# Patient Record
Sex: FEMALE | Race: WHITE | Employment: OTHER | URBAN - METROPOLITAN AREA
[De-identification: names, ages, dates, MRNs, and addresses within clinical notes are randomized per-mention and may not be internally consistent; named-entity substitution may affect disease eponyms.]

---

## 2020-01-29 ENCOUNTER — NURSING HOME VISIT (OUTPATIENT)
Dept: GERIATRICS | Facility: CLINIC | Age: 70
End: 2020-01-29
Payer: COMMERCIAL

## 2020-01-29 VITALS
OXYGEN SATURATION: 96 % | DIASTOLIC BLOOD PRESSURE: 54 MMHG | TEMPERATURE: 97.7 F | WEIGHT: 186 LBS | HEART RATE: 88 BPM | RESPIRATION RATE: 16 BRPM | HEIGHT: 65 IN | SYSTOLIC BLOOD PRESSURE: 114 MMHG | BODY MASS INDEX: 30.99 KG/M2

## 2020-01-29 DIAGNOSIS — J44.9 CHRONIC OBSTRUCTIVE PULMONARY DISEASE, UNSPECIFIED COPD TYPE (H): ICD-10-CM

## 2020-01-29 DIAGNOSIS — G47.00 INSOMNIA, UNSPECIFIED TYPE: ICD-10-CM

## 2020-01-29 DIAGNOSIS — Z72.0 TOBACCO ABUSE: ICD-10-CM

## 2020-01-29 DIAGNOSIS — R60.0 LOCALIZED EDEMA: ICD-10-CM

## 2020-01-29 DIAGNOSIS — E87.6 HYPOKALEMIA: ICD-10-CM

## 2020-01-29 DIAGNOSIS — E44.0 MODERATE PROTEIN-CALORIE MALNUTRITION (H): ICD-10-CM

## 2020-01-29 DIAGNOSIS — K61.2 ABSCESS OF ANAL AND RECTAL REGIONS: Primary | ICD-10-CM

## 2020-01-29 DIAGNOSIS — K52.9 CHRONIC DIARRHEA: ICD-10-CM

## 2020-01-29 DIAGNOSIS — G47.33 OSA (OBSTRUCTIVE SLEEP APNEA): ICD-10-CM

## 2020-01-29 DIAGNOSIS — R19.7 DIARRHEA, UNSPECIFIED TYPE: ICD-10-CM

## 2020-01-29 PROCEDURE — 99310 SBSQ NF CARE HIGH MDM 45: CPT | Performed by: NURSE PRACTITIONER

## 2020-01-29 RX ORDER — GABAPENTIN 100 MG/1
100 CAPSULE ORAL 3 TIMES DAILY
COMMUNITY
End: 2020-01-30

## 2020-01-29 RX ORDER — PYRIDOXINE HCL (VITAMIN B6) 25 MG
25 TABLET ORAL DAILY
COMMUNITY

## 2020-01-29 RX ORDER — IPRATROPIUM BROMIDE AND ALBUTEROL SULFATE 2.5; .5 MG/3ML; MG/3ML
1 SOLUTION RESPIRATORY (INHALATION) 4 TIMES DAILY
COMMUNITY

## 2020-01-29 RX ORDER — METRONIDAZOLE 250 MG/1
250 TABLET ORAL 3 TIMES DAILY
COMMUNITY

## 2020-01-29 RX ORDER — HYOSCYAMINE SULFATE 0.125 MG
0.12 TABLET ORAL EVERY 4 HOURS PRN
COMMUNITY

## 2020-01-29 RX ORDER — LANOLIN ALCOHOL/MO/W.PET/CERES
3 CREAM (GRAM) TOPICAL AT BEDTIME
COMMUNITY

## 2020-01-29 RX ORDER — FUROSEMIDE 20 MG
20 TABLET ORAL DAILY
COMMUNITY

## 2020-01-29 RX ORDER — LEVOFLOXACIN 750 MG/1
750 TABLET, FILM COATED ORAL DAILY
COMMUNITY

## 2020-01-29 ASSESSMENT — MIFFLIN-ST. JEOR: SCORE: 1359.57

## 2020-01-29 NOTE — PROGRESS NOTES
[place] [facility]  PRIMARY CARE PROVIDER AND CLINIC:  No primary care provider on file., No primary physician on file.  Chief Complaint   Patient presents with     Establish Care     [place] Medical Record Number:  [id number]  Place of Service where encounter took place:  [facility] [[id number]]    Josselyn Kilgore  is a 69 year old  (1950), admitted to the above facility from [facility]..  Admitted to this facility for  rehab, medical management and nursing care.    HPI:    HPI information obtained from: facility chart records, facility staff, patient report, [facility] chart review and Care Everywhere [facility] chart review.   Brief Summary of Hospital Course:   HOSPITAL COURSE:  Patient with chronic diarrhea x 6 months (started June 2019). Previous cx neg for c diff or infectious cause. MRI from Sept shows abscess, patient did not follow up.  Presented to the hospital with weakness. CT scan showed a  rectosigmoid junction abscess. She was hypotensive and an elevated lactic acid. Acidosis and blood pressure improved with IV fluids.  Patient was placed on broad-spectrum antibiotics with cefepime, Flagyl, and vancomycin. She was seen by IR and had a percutaneous drain placed with evacuation of pus. Cultures showing likely enterococcus but also has a gram-negative bacilli.    Patient discharged to home from the hospital, but was unable to get up on her own 2/2 to weakness. She is admitted to TCU for strengthening. She is oral flagyl and Levaquin.      Updates on Status Since Skilled nursing Admission:   - patient reports she is tolerating drain with minimal pain. States GI will remove in 5 day. She states she was told she did not need to take Levaquin and has been refusing this.     - patient has not smoked since admission to TCU, states she is tolerating this and does not feel she needs a nicotine patch. Feels her breathing is at baseline.     No fever,  "chills. Feels weak, no pain.   No nsg concerns    CODE STATUS/ADVANCE DIRECTIVES DISCUSSION:   CPR/Full code   Patient's living condition: lives with spouse  ALLERGIES: Patient has no known allergies.  PAST MEDICAL HISTORY:  has no past medical history on file.  PAST SURGICAL HISTORY:   has no past surgical history on file.  FAMILY HISTORY: family history is not on file.  SOCIAL HISTORY:       Post Discharge Medication Reconciliation Status: discharge medications reconciled, continue medications without change    Current Outpatient Medications   Medication Sig Dispense Refill     cholecalciferol (VITAMIN D3) 5000 units (125 mcg) capsule Take 5,000 Units by mouth daily       furosemide (LASIX) 20 MG tablet Take 20 mg by mouth daily       hyoscyamine (ANASPAZ/LEVSIN) 0.125 MG tablet Take 0.125 mg by mouth every 4 hours as needed for cramping       ipratropium - albuterol 0.5 mg/2.5 mg/3 mL (DUONEB) 0.5-2.5 (3) MG/3ML neb solution Take 1 vial by nebulization 4 times daily       levofloxacin (LEVAQUIN) 750 MG tablet Take 750 mg by mouth daily       melatonin 3 MG tablet Take 3 mg by mouth At Bedtime       metroNIDAZOLE (FLAGYL) 250 MG tablet Take 250 mg by mouth 3 times daily       pyridOXINE (VITAMIN  B-6) 25 MG tablet Take 25 mg by mouth daily         ROS:  4 point ROS including Respiratory, CV, GI and , other than that noted in the HPI,  is negative    Vitals:  /54   Pulse 88   Temp 97.7  F (36.5  C)   Resp 16   Ht 1.651 m (5' 5\")   Wt 84.4 kg (186 lb)   SpO2 96%   BMI 30.95 kg/m    Exam:  GENERAL APPEARANCE:  Alert, in no distress  RESP:  no respiratory distress, diminished breath sounds with faint exp wheezing upper lobes  CV:  regular rate and rhythm, no murmur, rub, or gallop, + generalized edema  ABDOMEN:  diffuse tenderness, soft, no guarding or rebound, bowel sounds normal  SKIN:  arms with extensive ecchymotic areas, multiple small skin tears  PSYCH:  oriented X 3, affect and mood " normal    Lab/Diagnostic data:        ASSESSMENT/PLAN:  Abscess of anal and rectal regions  Chronic diarrhea  - CT with: 6.2x4.7x7.1 cm abscess along the superior and posterio aspect of the rectosigmoid junction   - flex sig 1/22, biopsies obtained, abscess drain placed  - cx's likely enterococcus but also has a gram-negative bacilli  Discharged on flagyl and Levaquin. Patient stating she was told by GI that she did not need Levaquin. RN to call GI to verify orders  - Due to f/u with GI 2/3/20 for drain removal    Diarrhea, unspecified type  - chronic, stool cx's previously negative  - to f/u with GI    Chronic obstructive pulmonary disease, unspecified COPD type (H)  Tobacco abuse  - has tolerated no cigaretts since admission to TCU, will continue to encourage smoking cessation  - continue duo nebs  - nsg to update with breathing concerns    EMELINA (obstructive sleep apnea)  - continue CPAP    Localized edema  - diffuse. Likely 2/2 to low albumin and limited mobility  - no known hx of CHF, will check daily wt's  - continue lasix for now, labs ordered    Hypokalemia  - last K+ from hosp 2.3. Is not on replacement  - on lasix and having diarrhea, will re check labs     Insomnia  - Patient requesting sleep aid, will start melatonin  - patient refusing gabapentin, will discontinue     Protein calorie malnturition  - albumin 2.0, significant wt loss  - dietician to see  - will recheck labs    transcribed by : Lima Guerra  Orders:  1. Melatonin 3 mg at bedtime - dx: insomnia  2. Discontinue Gabapentin  3. CBC, CMP - dx: Rectal abcess  4. Weigh pt every day x 7 days - dx: weight loss  5. Discontinue ASA  6. Dietician to see - dx: wt. loss    Total time spent with patient visit at the [facility] was 36 including patient visit, review of past records and d/w RN. Greater than 50% of total time spent with counseling and coordinating care due to recent rectal abscess with imaging and d/w RN and pt  about medications concerns and antibiotics regimen. D/w patient re smoking cessation counseling.    Electronically signed by:  RAYRAY Guzman CNP

## 2020-01-29 NOTE — LETTER
1/29/2020        RE: Josselyn Kilgore  [address] [facility] [address] [facility] [address]        [place] [facility]  PRIMARY CARE PROVIDER AND CLINIC:  No primary care provider on file., No primary physician on file.  Chief Complaint   Patient presents with     Establish Care     [place] Medical Record Number:  [id number]  Place of Service where encounter took place[facility] [[id number]]    Josselyn Kilgore  is a 69 year old  (1950), admitted to the above facility from [facility]..  Admitted to this facility for  rehab, medical management and nursing care.    HPI:    HPI information obtained from: facility chart records, facility staff, patient report, [facility] chart review and Care Everywhere [facility] chart review.   Brief Summary of Hospital Course:   HOSPITAL COURSE:  Patient with chronic diarrhea x 6 months (started June 2019). Previous cx neg for c diff or infectious cause. MRI from Sept shows abscess, patient did not follow up.  Presented to the hospital with weakness. CT scan showed a  rectosigmoid junction abscess. She was hypotensive and an elevated lactic acid. Acidosis and blood pressure improved with IV fluids.  Patient was placed on broad-spectrum antibiotics with cefepime, Flagyl, and vancomycin. She was seen by IR and had a percutaneous drain placed with evacuation of pus. Cultures showing likely enterococcus but also has a gram-negative bacilli.    Patient discharged to home from the hospital, but was unable to get up on her own 2/2 to weakness. She is admitted to TCU for strengthening. She is oral flagyl and Levaquin.      Updates on Status Since Skilled nursing Admission:   - patient reports she is tolerating drain with minimal pain. States GI will remove in 5 day. She states she was told she did not need to take Levaquin and has been refusing this.     - patient has not smoked since admission to TCU, states she is tolerating this and does not  "feel she needs a nicotine patch. Feels her breathing is at baseline.     No fever, chills. Feels weak, no pain.   No nsg concerns    CODE STATUS/ADVANCE DIRECTIVES DISCUSSION:   CPR/Full code   Patient's living condition: lives with spouse  ALLERGIES: Patient has no known allergies.  PAST MEDICAL HISTORY:  has no past medical history on file.  PAST SURGICAL HISTORY:   has no past surgical history on file.  FAMILY HISTORY: family history is not on file.  SOCIAL HISTORY:       Post Discharge Medication Reconciliation Status: discharge medications reconciled, continue medications without change    Current Outpatient Medications   Medication Sig Dispense Refill     cholecalciferol (VITAMIN D3) 5000 units (125 mcg) capsule Take 5,000 Units by mouth daily       furosemide (LASIX) 20 MG tablet Take 20 mg by mouth daily       hyoscyamine (ANASPAZ/LEVSIN) 0.125 MG tablet Take 0.125 mg by mouth every 4 hours as needed for cramping       ipratropium - albuterol 0.5 mg/2.5 mg/3 mL (DUONEB) 0.5-2.5 (3) MG/3ML neb solution Take 1 vial by nebulization 4 times daily       levofloxacin (LEVAQUIN) 750 MG tablet Take 750 mg by mouth daily       melatonin 3 MG tablet Take 3 mg by mouth At Bedtime       metroNIDAZOLE (FLAGYL) 250 MG tablet Take 250 mg by mouth 3 times daily       pyridOXINE (VITAMIN  B-6) 25 MG tablet Take 25 mg by mouth daily         ROS:  4 point ROS including Respiratory, CV, GI and , other than that noted in the HPI,  is negative    Vitals:  /54   Pulse 88   Temp 97.7  F (36.5  C)   Resp 16   Ht 1.651 m (5' 5\")   Wt 84.4 kg (186 lb)   SpO2 96%   BMI 30.95 kg/m     Exam:  GENERAL APPEARANCE:  Alert, in no distress  RESP:  no respiratory distress, diminished breath sounds with faint exp wheezing upper lobes  CV:  regular rate and rhythm, no murmur, rub, or gallop, + generalized edema  ABDOMEN:  diffuse tenderness, soft, no guarding or rebound, bowel sounds normal  SKIN:  arms with extensive ecchymotic " areas, multiple small skin tears  PSYCH:  oriented X 3, affect and mood normal    Lab/Diagnostic data:        ASSESSMENT/PLAN:  Abscess of anal and rectal regions  Chronic diarrhea  - CT with: 6.2x4.7x7.1 cm abscess along the superior and posterio aspect of the rectosigmoid junction   - flex sig 1/22, biopsies obtained, abscess drain placed  - cx's likely enterococcus but also has a gram-negative bacilli  Discharged on flagyl and Levaquin. Patient stating she was told by GI that she did not need Levaquin. RN to call GI to verify orders  - Due to f/u with GI 2/3/20 for drain removal    Diarrhea, unspecified type  - chronic, stool cx's previously negative  - to f/u with GI    Chronic obstructive pulmonary disease, unspecified COPD type (H)  Tobacco abuse  - has tolerated no cigaretts since admission to TCU, will continue to encourage smoking cessation  - continue duo nebs  - nsg to update with breathing concerns    EMELINA (obstructive sleep apnea)  - continue CPAP    Localized edema  - diffuse. Likely 2/2 to low albumin and limited mobility  - no known hx of CHF, will check daily wt's  - continue lasix for now, labs ordered    Hypokalemia  - last K+ from hosp 2.3. Is not on replacement  - on lasix and having diarrhea, will re check labs     Insomnia  - Patient requesting sleep aid, will start melatonin  - patient refusing gabapentin, will discontinue     Protein calorie malnturition  - albumin 2.0, significant wt loss  - dietician to see  - will recheck labs    transcribed by : Lima Guerra  Orders:  1. Melatonin 3 mg at bedtime - dx: insomnia  2. Discontinue Gabapentin  3. CBC, CMP - dx: Rectal abcess  4. Weigh pt every day x 7 days - dx: weight loss  5. Discontinue ASA  6. Dietician to see - dx: wt. loss    Total time spent with patient visit at the skilled nursing facility was 36 including patient visit, review of past records and d/w RN. Greater than 50% of total time spent with counseling and  coordinating care due to recent rectal abscess with imaging and d/w RN and pt about medications concerns and antibiotics regimen. D/w patient re smoking cessation counseling.    Electronically signed by:  RAYRAY Guzman CNP                       Sincerely,        RAYRAY Guzman CNP

## 2020-01-30 ENCOUNTER — HOSPITAL LABORATORY (OUTPATIENT)
Facility: OTHER | Age: 70
End: 2020-01-30

## 2020-01-30 PROBLEM — Z72.0 TOBACCO ABUSE: Status: ACTIVE | Noted: 2020-01-30

## 2020-01-30 PROBLEM — J44.9 CHRONIC OBSTRUCTIVE PULMONARY DISEASE (H): Status: ACTIVE | Noted: 2020-01-30

## 2020-01-30 PROBLEM — K52.9 CHRONIC DIARRHEA: Status: ACTIVE | Noted: 2020-01-30

## 2020-01-30 PROBLEM — G47.33 OSA (OBSTRUCTIVE SLEEP APNEA): Status: ACTIVE | Noted: 2020-01-30

## 2020-01-30 PROBLEM — K61.2 ABSCESS OF ANAL AND RECTAL REGIONS: Status: ACTIVE | Noted: 2020-01-30

## 2020-01-30 LAB
ALBUMIN SERPL-MCNC: 2 G/DL (ref 3.4–5)
ALP SERPL-CCNC: 81 U/L (ref 40–150)
ALT SERPL W P-5'-P-CCNC: 15 U/L (ref 0–50)
ANION GAP SERPL CALCULATED.3IONS-SCNC: 9 MMOL/L (ref 3–14)
AST SERPL W P-5'-P-CCNC: 21 U/L (ref 0–45)
BILIRUB SERPL-MCNC: 0.5 MG/DL (ref 0.2–1.3)
BUN SERPL-MCNC: 8 MG/DL (ref 7–30)
CALCIUM SERPL-MCNC: 8.2 MG/DL (ref 8.5–10.1)
CHLORIDE SERPL-SCNC: 98 MMOL/L (ref 94–109)
CO2 SERPL-SCNC: 33 MMOL/L (ref 20–32)
CREAT SERPL-MCNC: 0.62 MG/DL (ref 0.52–1.04)
ERYTHROCYTE [DISTWIDTH] IN BLOOD BY AUTOMATED COUNT: 14.2 % (ref 10–15)
GFR SERPL CREATININE-BSD FRML MDRD: >90 ML/MIN/{1.73_M2}
GLUCOSE SERPL-MCNC: 74 MG/DL (ref 70–99)
HCT VFR BLD AUTO: 37.6 % (ref 35–47)
HGB BLD-MCNC: 12.9 G/DL (ref 11.7–15.7)
MCH RBC QN AUTO: 35.5 PG (ref 26.5–33)
MCHC RBC AUTO-ENTMCNC: 34.3 G/DL (ref 31.5–36.5)
MCV RBC AUTO: 104 FL (ref 78–100)
PLATELET # BLD AUTO: 257 10E9/L (ref 150–450)
POTASSIUM SERPL-SCNC: 2.6 MMOL/L (ref 3.4–5.3)
PROT SERPL-MCNC: 5.1 G/DL (ref 6.8–8.8)
RBC # BLD AUTO: 3.63 10E12/L (ref 3.8–5.2)
SODIUM SERPL-SCNC: 140 MMOL/L (ref 133–144)
WBC # BLD AUTO: 6.5 10E9/L (ref 4–11)

## 2020-02-03 ENCOUNTER — DOCUMENTATION ONLY (OUTPATIENT)
Dept: OTHER | Facility: CLINIC | Age: 70
End: 2020-02-03

## 2020-07-02 ENCOUNTER — RECORDS - HEALTHEAST (OUTPATIENT)
Dept: LAB | Facility: CLINIC | Age: 70
End: 2020-07-02

## 2020-07-03 LAB
ANION GAP SERPL CALCULATED.3IONS-SCNC: 10 MMOL/L (ref 5–18)
BASOPHILS # BLD AUTO: 0.1 THOU/UL (ref 0–0.2)
BASOPHILS NFR BLD AUTO: 1 % (ref 0–2)
BUN SERPL-MCNC: 10 MG/DL (ref 8–28)
CALCIUM SERPL-MCNC: 9 MG/DL (ref 8.5–10.5)
CHLORIDE BLD-SCNC: 84 MMOL/L (ref 98–107)
CO2 SERPL-SCNC: 35 MMOL/L (ref 22–31)
CREAT SERPL-MCNC: 0.75 MG/DL (ref 0.6–1.1)
EOSINOPHIL # BLD AUTO: 0.4 THOU/UL (ref 0–0.4)
EOSINOPHIL NFR BLD AUTO: 5 % (ref 0–6)
ERYTHROCYTE [DISTWIDTH] IN BLOOD BY AUTOMATED COUNT: 14.8 % (ref 11–14.5)
GFR SERPL CREATININE-BSD FRML MDRD: >60 ML/MIN/1.73M2
GLUCOSE BLD-MCNC: 74 MG/DL (ref 70–125)
HCT VFR BLD AUTO: 39.2 % (ref 35–47)
HGB BLD-MCNC: 13 G/DL (ref 12–16)
LYMPHOCYTES # BLD AUTO: 2.3 THOU/UL (ref 0.8–4.4)
LYMPHOCYTES NFR BLD AUTO: 34 % (ref 20–40)
MCH RBC QN AUTO: 30.9 PG (ref 27–34)
MCHC RBC AUTO-ENTMCNC: 33.2 G/DL (ref 32–36)
MCV RBC AUTO: 93 FL (ref 80–100)
MONOCYTES # BLD AUTO: 0.8 THOU/UL (ref 0–0.9)
MONOCYTES NFR BLD AUTO: 11 % (ref 2–10)
NEUTROPHILS # BLD AUTO: 3.2 THOU/UL (ref 2–7.7)
NEUTROPHILS NFR BLD AUTO: 48 % (ref 50–70)
PLATELET # BLD AUTO: 302 THOU/UL (ref 140–440)
PMV BLD AUTO: 10.5 FL (ref 8.5–12.5)
POTASSIUM BLD-SCNC: 3.2 MMOL/L (ref 3.5–5)
RBC # BLD AUTO: 4.21 MILL/UL (ref 3.8–5.4)
SODIUM SERPL-SCNC: 129 MMOL/L (ref 136–145)
WBC: 6.7 THOU/UL (ref 4–11)

## 2020-07-06 ENCOUNTER — RECORDS - HEALTHEAST (OUTPATIENT)
Dept: LAB | Facility: CLINIC | Age: 70
End: 2020-07-06

## 2020-07-06 LAB
ANION GAP SERPL CALCULATED.3IONS-SCNC: 10 MMOL/L (ref 5–18)
BUN SERPL-MCNC: 11 MG/DL (ref 8–28)
CALCIUM SERPL-MCNC: 9 MG/DL (ref 8.5–10.5)
CHLORIDE BLD-SCNC: 94 MMOL/L (ref 98–107)
CO2 SERPL-SCNC: 27 MMOL/L (ref 22–31)
CREAT SERPL-MCNC: 0.76 MG/DL (ref 0.6–1.1)
GFR SERPL CREATININE-BSD FRML MDRD: >60 ML/MIN/1.73M2
GLUCOSE BLD-MCNC: 70 MG/DL (ref 70–125)
POTASSIUM BLD-SCNC: 3.9 MMOL/L (ref 3.5–5)
SODIUM SERPL-SCNC: 131 MMOL/L (ref 136–145)

## 2020-07-08 ENCOUNTER — RECORDS - HEALTHEAST (OUTPATIENT)
Dept: LAB | Facility: CLINIC | Age: 70
End: 2020-07-08

## 2020-07-08 LAB
C DIFF TOX B STL QL: NEGATIVE
RIBOTYPE 027/NAP1/BI: NORMAL

## 2020-07-13 ENCOUNTER — RECORDS - HEALTHEAST (OUTPATIENT)
Dept: LAB | Facility: CLINIC | Age: 70
End: 2020-07-13

## 2020-07-13 LAB
ANION GAP SERPL CALCULATED.3IONS-SCNC: 8 MMOL/L (ref 5–18)
BUN SERPL-MCNC: 7 MG/DL (ref 8–28)
CALCIUM SERPL-MCNC: 9.2 MG/DL (ref 8.5–10.5)
CHLORIDE BLD-SCNC: 97 MMOL/L (ref 98–107)
CO2 SERPL-SCNC: 30 MMOL/L (ref 22–31)
CREAT SERPL-MCNC: 0.63 MG/DL (ref 0.6–1.1)
GFR SERPL CREATININE-BSD FRML MDRD: >60 ML/MIN/1.73M2
GLUCOSE BLD-MCNC: 77 MG/DL (ref 70–125)
POTASSIUM BLD-SCNC: 3.1 MMOL/L (ref 3.5–5)
SODIUM SERPL-SCNC: 135 MMOL/L (ref 136–145)

## 2020-07-16 ENCOUNTER — RECORDS - HEALTHEAST (OUTPATIENT)
Dept: LAB | Facility: CLINIC | Age: 70
End: 2020-07-16

## 2020-07-17 LAB
ANION GAP SERPL CALCULATED.3IONS-SCNC: 9 MMOL/L (ref 5–18)
BUN SERPL-MCNC: 8 MG/DL (ref 8–28)
CALCIUM SERPL-MCNC: 9.1 MG/DL (ref 8.5–10.5)
CHLORIDE BLD-SCNC: 100 MMOL/L (ref 98–107)
CO2 SERPL-SCNC: 27 MMOL/L (ref 22–31)
CREAT SERPL-MCNC: 0.65 MG/DL (ref 0.6–1.1)
GFR SERPL CREATININE-BSD FRML MDRD: >60 ML/MIN/1.73M2
GLUCOSE BLD-MCNC: 73 MG/DL (ref 70–125)
MAGNESIUM SERPL-MCNC: 2.1 MG/DL (ref 1.8–2.6)
POTASSIUM BLD-SCNC: 3.5 MMOL/L (ref 3.5–5)
SODIUM SERPL-SCNC: 136 MMOL/L (ref 136–145)